# Patient Record
Sex: FEMALE | Race: WHITE | NOT HISPANIC OR LATINO
[De-identification: names, ages, dates, MRNs, and addresses within clinical notes are randomized per-mention and may not be internally consistent; named-entity substitution may affect disease eponyms.]

---

## 2018-06-11 PROBLEM — Z00.00 ENCOUNTER FOR PREVENTIVE HEALTH EXAMINATION: Status: ACTIVE | Noted: 2018-06-11

## 2018-06-19 ENCOUNTER — APPOINTMENT (OUTPATIENT)
Dept: PLASTIC SURGERY | Facility: CLINIC | Age: 43
End: 2018-06-19
Payer: COMMERCIAL

## 2018-06-19 VITALS
HEIGHT: 65 IN | TEMPERATURE: 97.6 F | BODY MASS INDEX: 34.16 KG/M2 | HEART RATE: 62 BPM | SYSTOLIC BLOOD PRESSURE: 118 MMHG | WEIGHT: 205 LBS | DIASTOLIC BLOOD PRESSURE: 81 MMHG

## 2018-06-19 DIAGNOSIS — Z80.8 FAMILY HISTORY OF MALIGNANT NEOPLASM OF OTHER ORGANS OR SYSTEMS: ICD-10-CM

## 2018-06-19 DIAGNOSIS — Z83.42 FAMILY HISTORY OF FAMILIAL HYPERCHOLESTEROLEMIA: ICD-10-CM

## 2018-06-19 DIAGNOSIS — Z80.0 FAMILY HISTORY OF MALIGNANT NEOPLASM OF DIGESTIVE ORGANS: ICD-10-CM

## 2018-06-19 DIAGNOSIS — Z87.891 PERSONAL HISTORY OF NICOTINE DEPENDENCE: ICD-10-CM

## 2018-06-19 DIAGNOSIS — Z78.9 OTHER SPECIFIED HEALTH STATUS: ICD-10-CM

## 2018-06-19 DIAGNOSIS — Z82.49 FAMILY HISTORY OF ISCHEMIC HEART DISEASE AND OTHER DISEASES OF THE CIRCULATORY SYSTEM: ICD-10-CM

## 2018-06-19 DIAGNOSIS — N83.8 OTHER NONINFLAMMATORY DISORDERS OF OVARY, FALLOPIAN TUBE AND BROAD LIGAMENT: ICD-10-CM

## 2018-06-19 DIAGNOSIS — Z87.09 PERSONAL HISTORY OF OTHER DISEASES OF THE RESPIRATORY SYSTEM: ICD-10-CM

## 2018-06-19 PROCEDURE — 99203 OFFICE O/P NEW LOW 30 MIN: CPT

## 2018-06-19 RX ORDER — MV-MIN/FOLIC/VIT K/LYCOP/COQ10 200-100MCG
CAPSULE ORAL
Refills: 0 | Status: ACTIVE | COMMUNITY

## 2018-06-19 RX ORDER — GLUC/MSM/COLGN2/HYAL/ANTIARTH3 375-375-20
TABLET ORAL
Refills: 0 | Status: ACTIVE | COMMUNITY

## 2018-06-19 RX ORDER — PNV NO.95/FERROUS FUM/FOLIC AC 28MG-0.8MG
TABLET ORAL
Refills: 0 | Status: ACTIVE | COMMUNITY

## 2018-06-19 RX ORDER — CHROMIUM 200 MCG
TABLET ORAL
Refills: 0 | Status: ACTIVE | COMMUNITY

## 2018-06-29 RX ORDER — LIDOCAINE 40 MG/G
4 CREAM TOPICAL
Qty: 15 | Refills: 0 | Status: ACTIVE | COMMUNITY
Start: 2018-06-29 | End: 1900-01-01

## 2018-07-03 ENCOUNTER — APPOINTMENT (OUTPATIENT)
Dept: PLASTIC SURGERY | Facility: CLINIC | Age: 43
End: 2018-07-03
Payer: COMMERCIAL

## 2018-07-03 ENCOUNTER — LABORATORY RESULT (OUTPATIENT)
Age: 43
End: 2018-07-03

## 2018-07-03 PROCEDURE — 13151 CMPLX RPR E/N/E/L 1.1-2.5 CM: CPT

## 2018-07-03 PROCEDURE — 11442 EXC FACE-MM B9+MARG 1.1-2 CM: CPT

## 2018-07-10 ENCOUNTER — APPOINTMENT (OUTPATIENT)
Dept: PLASTIC SURGERY | Facility: CLINIC | Age: 43
End: 2018-07-10
Payer: COMMERCIAL

## 2018-07-10 PROCEDURE — 99024 POSTOP FOLLOW-UP VISIT: CPT

## 2018-07-16 ENCOUNTER — APPOINTMENT (OUTPATIENT)
Dept: PLASTIC SURGERY | Facility: CLINIC | Age: 43
End: 2018-07-16
Payer: COMMERCIAL

## 2018-07-16 PROCEDURE — 99024 POSTOP FOLLOW-UP VISIT: CPT

## 2018-07-31 ENCOUNTER — APPOINTMENT (OUTPATIENT)
Dept: PLASTIC SURGERY | Facility: CLINIC | Age: 43
End: 2018-07-31
Payer: COMMERCIAL

## 2018-07-31 PROCEDURE — 99024 POSTOP FOLLOW-UP VISIT: CPT

## 2019-02-18 ENCOUNTER — NON-APPOINTMENT (OUTPATIENT)
Age: 44
End: 2019-02-18

## 2019-02-18 ENCOUNTER — APPOINTMENT (OUTPATIENT)
Dept: INTERNAL MEDICINE | Facility: CLINIC | Age: 44
End: 2019-02-18
Payer: COMMERCIAL

## 2019-02-18 ENCOUNTER — LABORATORY RESULT (OUTPATIENT)
Age: 44
End: 2019-02-18

## 2019-02-18 VITALS
SYSTOLIC BLOOD PRESSURE: 126 MMHG | HEIGHT: 64.17 IN | OXYGEN SATURATION: 98 % | HEART RATE: 71 BPM | DIASTOLIC BLOOD PRESSURE: 80 MMHG | WEIGHT: 219.47 LBS | BODY MASS INDEX: 37.47 KG/M2 | TEMPERATURE: 97.5 F

## 2019-02-18 DIAGNOSIS — R59.9 ENLARGED LYMPH NODES, UNSPECIFIED: ICD-10-CM

## 2019-02-18 DIAGNOSIS — D56.3 THALASSEMIA MINOR: ICD-10-CM

## 2019-02-18 DIAGNOSIS — H52.209 UNSPECIFIED ASTIGMATISM, UNSPECIFIED EYE: ICD-10-CM

## 2019-02-18 LAB
APPEARANCE: CLEAR
APPEARANCE: CLEAR
BACTERIA: ABNORMAL
BILIRUBIN URINE: NEGATIVE
BILIRUBIN URINE: NEGATIVE
BLOOD URINE: NEGATIVE
BLOOD URINE: NEGATIVE
COLOR: YELLOW
COLOR: YELLOW
GLUCOSE QUALITATIVE U: NEGATIVE
GLUCOSE QUALITATIVE U: NEGATIVE
HYALINE CASTS: 1 /LPF
KETONES URINE: NEGATIVE
KETONES URINE: NEGATIVE
LEUKOCYTE ESTERASE URINE: NEGATIVE
LEUKOCYTE ESTERASE URINE: NEGATIVE
MICROSCOPIC-UA: NORMAL
NITRITE URINE: NEGATIVE
NITRITE URINE: NEGATIVE
PH URINE: 7
PH URINE: 7
PROTEIN URINE: NEGATIVE
PROTEIN URINE: NEGATIVE
RED BLOOD CELLS URINE: 4 /HPF
SPECIFIC GRAVITY URINE: 1.02
SPECIFIC GRAVITY URINE: 1.02
SQUAMOUS EPITHELIAL CELLS: 8 /HPF
UROBILINOGEN URINE: NORMAL
UROBILINOGEN URINE: NORMAL
WHITE BLOOD CELLS URINE: 2 /HPF

## 2019-02-18 PROCEDURE — G0442 ANNUAL ALCOHOL SCREEN 15 MIN: CPT

## 2019-02-18 PROCEDURE — 99386 PREV VISIT NEW AGE 40-64: CPT | Mod: 25

## 2019-02-18 PROCEDURE — G0444 DEPRESSION SCREEN ANNUAL: CPT

## 2019-02-18 PROCEDURE — 93000 ELECTROCARDIOGRAM COMPLETE: CPT

## 2019-02-18 PROCEDURE — 81003 URINALYSIS AUTO W/O SCOPE: CPT | Mod: QW

## 2019-02-18 PROCEDURE — 99203 OFFICE O/P NEW LOW 30 MIN: CPT | Mod: 25

## 2019-02-18 PROCEDURE — 36415 COLL VENOUS BLD VENIPUNCTURE: CPT

## 2019-02-18 NOTE — PHYSICAL EXAM
[No Acute Distress] : no acute distress [Well Nourished] : well nourished [Well Developed] : well developed [Well-Appearing] : well-appearing [Normal Sclera/Conjunctiva] : normal sclera/conjunctiva [PERRL] : pupils equal round and reactive to light [EOMI] : extraocular movements intact [Normal Outer Ear/Nose] : the outer ears and nose were normal in appearance [Normal Oropharynx] : the oropharynx was normal [Normal TMs] : both tympanic membranes were normal [Normal Nasal Mucosa] : the nasal mucosa was normal [No JVD] : no jugular venous distention [Supple] : supple [No Lymphadenopathy] : no lymphadenopathy [No Respiratory Distress] : no respiratory distress  [Clear to Auscultation] : lungs were clear to auscultation bilaterally [No Accessory Muscle Use] : no accessory muscle use [Normal Rate] : normal rate  [Regular Rhythm] : with a regular rhythm [Normal S1, S2] : normal S1 and S2 [No Murmur] : no murmur heard [No Carotid Bruits] : no carotid bruits [No Abdominal Bruit] : a ~M bruit was not heard ~T in the abdomen [No Varicosities] : no varicosities [Pedal Pulses Present] : the pedal pulses are present [No Edema] : there was no peripheral edema [No Extremity Clubbing/Cyanosis] : no extremity clubbing/cyanosis [No Palpable Aorta] : no palpable aorta [Normal Appearance] : normal in appearance [No Nipple Discharge] : no nipple discharge [No Axillary Lymphadenopathy] : no axillary lymphadenopathy [Soft] : abdomen soft [Non Tender] : non-tender [Non-distended] : non-distended [No Masses] : no abdominal mass palpated [No HSM] : no HSM [Normal Bowel Sounds] : normal bowel sounds [Normal Supraclavicular Nodes] : no supraclavicular lymphadenopathy [Normal Axillary Nodes] : no axillary lymphadenopathy [Normal Posterior Cervical Nodes] : no posterior cervical lymphadenopathy [Normal Anterior Cervical Nodes] : no anterior cervical lymphadenopathy [Normal Inguinal Nodes] : no inguinal lymphadenopathy [No CVA Tenderness] : no CVA  tenderness [No Spinal Tenderness] : no spinal tenderness [No Joint Swelling] : no joint swelling [Grossly Normal Strength/Tone] : grossly normal strength/tone [No Rash] : no rash [No Skin Lesions] : no skin lesions [Normal Gait] : normal gait [Coordination Grossly Intact] : coordination grossly intact [No Focal Deficits] : no focal deficits [Cranial Nerves Oculomotor (III)] : the extraocular motions were intact [Cranial Nerves Trigeminal (V)] : sensation to the face and masseter strength were intact [Cranial Nerves Facial (VII)] : facial strength was intact bilaterally [Cranial Nerves Vestibulocochlear (VIII)] : hearing was intact [Cranial Nerves Glossopharyngeal (IX)] : there was normal movement of the soft palate and normal gag [Cranial Nerves Accessory (XI - Cranial And Spinal)] : shoulder shrug was intact bilaterally [Cranial Nerves Hypoglossal (XII)] : there was no tongue deviation with protrusion [Sensation Tactile Decrease] : light touch was intact [Speech Grossly Normal] : speech grossly normal [Normal Affect] : the affect was normal [Normal Insight/Judgement] : insight and judgment were intact

## 2019-02-18 NOTE — HISTORY OF PRESENT ILLNESS
[FreeTextEntry1] : Patient presents for annual physical. [de-identified] : Patient presents for annual physical, patient has no acute concerns patient has a history of migraines gets about 5-6 episodes a month. headache is bitemporal associated with nausea and photophobia makes activities of daily living such as walking up the stairs difficult. patient has tried tripped hands with no success. Patient also has a sister who has a history of thyroid cancer patient does have a thyroid nodule for which she has had biopsy in the past will be following up with endocrinologist in April. Last mammogram was 3 months ago, patient had colonoscopy in 2000 he has she had some rectal bleeding. Colonoscopy K. back normal.

## 2019-02-18 NOTE — ASSESSMENT
[FreeTextEntry1] : 1) Annual physical: Patient declined influenza, and a follow up Pap smear with gynecologist in 2 months. Patient had normal colonoscopy to rectal bleeding in 2008 EKG showed T-wave inversion that is present in the past. Patient denies any symptoms of angina.\par 2) migraine: Patient has tried contents with no alleviation of symptoms advised to take coenzyme Q10 and vitamin B 2 daily.\par 3) Thyroid Nodule: Patient will follow up with endocrinologist patient has had fine needle aspiration in the past. Patient denies any change in the size of the thyroid any hoarseness of voice any difficulty swallowing. Check TSH free T3 and free T4\par 4) Anemia: Patient has thalassemia trait. will repeat CBC today today.\par 5) Enlarged lymph node: Near the right breast patient had a mammogram 2 months ago will have a followup mammogram and ultrasound in 3 months. Patient denies any breast changes any tenderness on palpation of the breasts or any nipple abnormalities.\par 6) Astigmatism:  referral placed for ophthalmologist.

## 2019-02-18 NOTE — HEALTH RISK ASSESSMENT
[Very Good] : ~his/her~  mood as very good [] : No [0] : 2) Feeling down, depressed, or hopeless: Not at all (0) [de-identified] : None [de-identified] : Endocrinologist Dr. Mcgarry [Patient reported mammogram was normal] : Patient reported mammogram was normal [Change in mental status noted] : No change in mental status noted [Language] : denies difficulty with language [Behavior] : denies difficulty with behavior [Learning/Retaining New Information] : denies difficulty learning/retaining new information [Handling Complex Tasks] : denies difficulty handling complex tasks [Reasoning] : denies difficulty with reasoning [Employed] : employed [Feels Safe at Home] : Feels safe at home [Reports changes in hearing] : Reports no changes in hearing [Reports changes in vision] : Reports no changes in vision [Reports changes in dental health] : Reports no changes in dental health [Smoke Detector] : smoke detector [Carbon Monoxide Detector] : carbon monoxide detector [Guns at Home] : no guns at home [Safety elements used in home] : safety elements used in home [Seat Belt] :  uses seat belt [Sunscreen] : uses sunscreen [Travel to Developing Areas] : does not  travel to developing areas [TB Exposure] : is not being exposed to tuberculosis [Caregiver Concerns] : does not have caregiver concerns [MammogramDate] : 11/2018 [PapSmearComments] : Gynecologist appointment 4/2019 [ColonoscopyDate] : 2008 [ColonoscopyComments] : Normal [HIVComments] : Previously tested [HepatitisCComments] : Previosuly tested [FreeTextEntry2] :

## 2019-02-20 ENCOUNTER — MESSAGE (OUTPATIENT)
Age: 44
End: 2019-02-20

## 2019-02-20 LAB
25(OH)D3 SERPL-MCNC: 23.3 NG/ML
ALBUMIN SERPL ELPH-MCNC: 4.5 G/DL
ALP BLD-CCNC: 75 U/L
ALT SERPL-CCNC: 37 U/L
ANION GAP SERPL CALC-SCNC: 12 MMOL/L
AST SERPL-CCNC: 26 U/L
BASOPHILS # BLD AUTO: 0.06 K/UL
BASOPHILS NFR BLD AUTO: 1 %
BILIRUB SERPL-MCNC: 0.2 MG/DL
BUN SERPL-MCNC: 15 MG/DL
CALCIUM SERPL-MCNC: 9.6 MG/DL
CHLORIDE SERPL-SCNC: 103 MMOL/L
CHOLEST SERPL-MCNC: 223 MG/DL
CHOLEST/HDLC SERPL: 2.9 RATIO
CO2 SERPL-SCNC: 24 MMOL/L
CREAT SERPL-MCNC: 0.78 MG/DL
EOSINOPHIL # BLD AUTO: 0.33 K/UL
EOSINOPHIL NFR BLD AUTO: 5.3 %
GLUCOSE SERPL-MCNC: 93 MG/DL
HBA1C MFR BLD HPLC: 5.4 %
HCT VFR BLD CALC: 39.6 %
HDLC SERPL-MCNC: 77 MG/DL
HGB BLD-MCNC: 11.8 G/DL
IMM GRANULOCYTES NFR BLD AUTO: 0.2 %
LDLC SERPL CALC-MCNC: 124 MG/DL
LYMPHOCYTES # BLD AUTO: 1.37 K/UL
LYMPHOCYTES NFR BLD AUTO: 21.8 %
MAN DIFF?: NORMAL
MCHC RBC-ENTMCNC: 20.8 PG
MCHC RBC-ENTMCNC: 29.8 GM/DL
MCV RBC AUTO: 69.8 FL
MONOCYTES # BLD AUTO: 0.61 K/UL
MONOCYTES NFR BLD AUTO: 9.7 %
NEUTROPHILS # BLD AUTO: 3.9 K/UL
NEUTROPHILS NFR BLD AUTO: 62 %
PLATELET # BLD AUTO: 357 K/UL
POTASSIUM SERPL-SCNC: 4.3 MMOL/L
PROT SERPL-MCNC: 6.9 G/DL
RBC # BLD: 5.67 M/UL
RBC # FLD: 15.7 %
SODIUM SERPL-SCNC: 139 MMOL/L
T3 SERPL-MCNC: 116 NG/DL
TRIGL SERPL-MCNC: 112 MG/DL
TSH SERPL-ACNC: 2.23 UIU/ML
VIT B12 SERPL-MCNC: 479 PG/ML
WBC # FLD AUTO: 6.28 K/UL

## 2019-04-30 ENCOUNTER — LABORATORY RESULT (OUTPATIENT)
Age: 44
End: 2019-04-30

## 2019-04-30 ENCOUNTER — APPOINTMENT (OUTPATIENT)
Dept: PLASTIC SURGERY | Facility: CLINIC | Age: 44
End: 2019-04-30
Payer: MEDICAID

## 2019-04-30 PROCEDURE — 27327 EXC THIGH/KNEE LES SC < 3 CM: CPT

## 2019-04-30 PROCEDURE — 13120 CMPLX RPR S/A/L 1.1-2.5 CM: CPT | Mod: 59

## 2019-04-30 NOTE — PROCEDURE
[FreeTextEntry6] : Preop dx: lipoma, right thigh\par Postopdx: same\par Procedure: excision 2.1cm lipoma right thigh and complex closure of thigh, 2.1cm\par Anesthesia: local 1% w/ epi\par Specimens: to path\par Procedure:\par 	IC obtained. Lesion demarcated.  Lido 1% w/ epi injected.  15 blade used to incise skin.  Lesion encountered in the subcutaneous plane and dissected circumferentially. Removed in its entirety, 2.1cm.  Skin edges widely undermined and closed in layers with monocryl for a 2.1cm complex closure. Mastisol and steristrips placed.  No complications.  Specimen sent to path\par \par

## 2019-05-06 ENCOUNTER — APPOINTMENT (OUTPATIENT)
Dept: PLASTIC SURGERY | Facility: CLINIC | Age: 44
End: 2019-05-06
Payer: SELF-PAY

## 2019-05-06 DIAGNOSIS — D17.23 BENIGN LIPOMATOUS NEOPLASM OF SKIN AND SUBCUTANEOUS TISSUE OF RIGHT LEG: ICD-10-CM

## 2019-05-06 PROCEDURE — 99024 POSTOP FOLLOW-UP VISIT: CPT

## 2019-05-06 NOTE — HISTORY OF PRESENT ILLNESS
[FreeTextEntry1] : s/p excisional biopsy and closure of right thigh mass ( result:lipoma) DOP 04/30/19.\par Pt is doing better w/time, denies any pain or discomfort, Steri-Strips remain intact.\par Here for follow up.

## 2019-09-24 ENCOUNTER — APPOINTMENT (OUTPATIENT)
Dept: PLASTIC SURGERY | Facility: CLINIC | Age: 44
End: 2019-09-24
Payer: COMMERCIAL

## 2019-09-24 ENCOUNTER — LABORATORY RESULT (OUTPATIENT)
Age: 44
End: 2019-09-24

## 2019-09-24 DIAGNOSIS — D22.9 MELANOCYTIC NEVI, UNSPECIFIED: ICD-10-CM

## 2019-09-24 PROCEDURE — 11442 EXC FACE-MM B9+MARG 1.1-2 CM: CPT

## 2019-09-24 PROCEDURE — 13151 CMPLX RPR E/N/E/L 1.1-2.5 CM: CPT

## 2019-09-27 PROBLEM — D22.9 INTRADERMAL MELANOCYTIC NEVUS: Status: ACTIVE | Noted: 2018-06-19

## 2019-09-27 NOTE — PROCEDURE
[FreeTextEntry6] : Preopdx:nasal  nevus\par Procedure: excisional biopsy   1.1cm nevus of nose and complex closure 1.1cm\par Anesthesia: local 1% w/epi\par Specimens: to path on formalin\par No complications\par \par Summary:\par IC obtained.  Lesion demarcated with marking pen.  1%lido with epinephrine injected.  15 blade used to incise full thickness.    1.1Cm  lesion excised as an ellipse full thickness in subcutaneous plane.   Hemostasis obtained with cautery.  Skin edges widely undermined and closed for a complex closure of  1.1cm.  bacitracin and steristrips placed.\par

## 2020-05-13 ENCOUNTER — APPOINTMENT (OUTPATIENT)
Dept: INTERNAL MEDICINE | Facility: CLINIC | Age: 45
End: 2020-05-13
Payer: COMMERCIAL

## 2020-05-13 ENCOUNTER — NON-APPOINTMENT (OUTPATIENT)
Age: 45
End: 2020-05-13

## 2020-05-13 VITALS
WEIGHT: 250.32 LBS | SYSTOLIC BLOOD PRESSURE: 103 MMHG | HEART RATE: 75 BPM | OXYGEN SATURATION: 100 % | BODY MASS INDEX: 41.7 KG/M2 | DIASTOLIC BLOOD PRESSURE: 69 MMHG | TEMPERATURE: 98.8 F | HEIGHT: 64.96 IN

## 2020-05-13 DIAGNOSIS — Z01.818 ENCOUNTER FOR OTHER PREPROCEDURAL EXAMINATION: ICD-10-CM

## 2020-05-13 DIAGNOSIS — E04.1 NONTOXIC SINGLE THYROID NODULE: ICD-10-CM

## 2020-05-13 PROCEDURE — 99214 OFFICE O/P EST MOD 30 MIN: CPT | Mod: 25

## 2020-05-13 PROCEDURE — 36415 COLL VENOUS BLD VENIPUNCTURE: CPT

## 2020-05-15 DIAGNOSIS — R31.29 OTHER MICROSCOPIC HEMATURIA: ICD-10-CM

## 2020-05-15 LAB
25(OH)D3 SERPL-MCNC: 28.8 NG/ML
ALBUMIN SERPL ELPH-MCNC: 4.5 G/DL
ALP BLD-CCNC: 83 U/L
ALT SERPL-CCNC: 16 U/L
ANION GAP SERPL CALC-SCNC: 13 MMOL/L
APPEARANCE: CLEAR
APPEARANCE: CLEAR
APTT BLD: 33.4 SEC
AST SERPL-CCNC: 19 U/L
BACTERIA: NEGATIVE
BASOPHILS # BLD AUTO: 0.06 K/UL
BASOPHILS NFR BLD AUTO: 1 %
BILIRUB SERPL-MCNC: 0.4 MG/DL
BILIRUBIN URINE: NEGATIVE
BILIRUBIN URINE: NEGATIVE
BLOOD URINE: NEGATIVE
BLOOD URINE: NEGATIVE
BUN SERPL-MCNC: 13 MG/DL
CALCIUM SERPL-MCNC: 9.4 MG/DL
CHLORIDE SERPL-SCNC: 102 MMOL/L
CHOLEST SERPL-MCNC: 222 MG/DL
CHOLEST/HDLC SERPL: 3.5 RATIO
CO2 SERPL-SCNC: 23 MMOL/L
COLOR: YELLOW
COLOR: YELLOW
CREAT SERPL-MCNC: 0.73 MG/DL
EOSINOPHIL # BLD AUTO: 0.08 K/UL
EOSINOPHIL NFR BLD AUTO: 1.3 %
ESTIMATED AVERAGE GLUCOSE: 111 MG/DL
GLUCOSE QUALITATIVE U: NEGATIVE
GLUCOSE QUALITATIVE U: NEGATIVE
GLUCOSE SERPL-MCNC: 87 MG/DL
HBA1C MFR BLD HPLC: 5.5 %
HCT VFR BLD CALC: 41.1 %
HDLC SERPL-MCNC: 63 MG/DL
HGB BLD-MCNC: 11.9 G/DL
HYALINE CASTS: 2 /LPF
IMM GRANULOCYTES NFR BLD AUTO: 0.2 %
INR PPP: 0.97 RATIO
KETONES URINE: NEGATIVE
KETONES URINE: NEGATIVE
LDLC SERPL CALC-MCNC: 132 MG/DL
LEUKOCYTE ESTERASE URINE: NEGATIVE
LEUKOCYTE ESTERASE URINE: NEGATIVE
LYMPHOCYTES # BLD AUTO: 1.32 K/UL
LYMPHOCYTES NFR BLD AUTO: 22 %
MAN DIFF?: NORMAL
MCHC RBC-ENTMCNC: 20.4 PG
MCHC RBC-ENTMCNC: 29 GM/DL
MCV RBC AUTO: 70.6 FL
MICROSCOPIC-UA: NORMAL
MONOCYTES # BLD AUTO: 0.67 K/UL
MONOCYTES NFR BLD AUTO: 11.1 %
NEUTROPHILS # BLD AUTO: 3.87 K/UL
NEUTROPHILS NFR BLD AUTO: 64.4 %
NITRITE URINE: NEGATIVE
NITRITE URINE: NEGATIVE
PH URINE: 6.5
PH URINE: 6.5
PLATELET # BLD AUTO: 430 K/UL
POTASSIUM SERPL-SCNC: 4.6 MMOL/L
PROT SERPL-MCNC: 7.1 G/DL
PROTEIN URINE: NORMAL
PROTEIN URINE: NORMAL
PT BLD: 11 SEC
RBC # BLD: 5.82 M/UL
RBC # FLD: 16 %
RED BLOOD CELLS URINE: 5 /HPF
SODIUM SERPL-SCNC: 138 MMOL/L
SPECIFIC GRAVITY URINE: 1.02
SPECIFIC GRAVITY URINE: 1.02
SQUAMOUS EPITHELIAL CELLS: 5 /HPF
TRIGL SERPL-MCNC: 134 MG/DL
TSH SERPL-ACNC: 1.34 UIU/ML
UROBILINOGEN URINE: NORMAL
UROBILINOGEN URINE: NORMAL
WBC # FLD AUTO: 6.01 K/UL
WHITE BLOOD CELLS URINE: 1 /HPF

## 2020-05-15 NOTE — HISTORY OF PRESENT ILLNESS
[No Pertinent Cardiac History] : no history of aortic stenosis, atrial fibrillation, coronary artery disease, recent myocardial infarction, or implantable device/pacemaker [No Pertinent Pulmonary History] : no history of asthma, COPD, sleep apnea, or smoking [No Adverse Anesthesia Reaction] : no adverse anesthesia reaction in self or family member [(Patient denies any chest pain, claudication, dyspnea on exertion, orthopnea, palpitations or syncope)] : Patient denies any chest pain, claudication, dyspnea on exertion, orthopnea, palpitations or syncope [Good (7-10 METs)] : Good (7-10 METs) [Chronic Anticoagulation] : no chronic anticoagulation [Chronic Kidney Disease] : no chronic kidney disease [Diabetes] : no diabetes [FreeTextEntry1] : D and C hysteroscopy, and polypectomy [FreeTextEntry4] : atient also has chronic migraine subsided associated with nausea triggered by change in weather. Also associated with photophobia. Denies any dizziness lightheadedness numbness. Denies any weakness or paresthesias.Denies any presyncope. Headache has not changed in characteror

## 2020-05-15 NOTE — ASSESSMENT
[High Risk Surgery - Intraperitoneal, Intrathoracic or Supringuinal Vascular Procedures] : High Risk Surgery - Intraperitoneal, Intrathoracic or Supringuinal Vascular Procedures - No (0) [Ischemic Heart Disease] : Ischemic Heart Disease - No (0) [Congestive Heart Failure] : Congestive Heart Failure - No (0) [Prior Cerebrovascular Accident or TIA] : Prior Cerebrovascular Accident or TIA - No (0) [Creatinine >= 2mg/dL (1 Point)] : Creatinine >= 2mg/dL - No (0) [Insulin-dependent Diabetic (1 Point)] : Insulin-dependent Diabetic - No (0) [Patient Optimized for Surgery] : Patient optimized for surgery [Modify medications prior to procedure] : Modify medications prior to procedure [FreeTextEntry7] : Discontinue NSAID's one week prior to surgery.  [FreeTextEntry4] : Patient's migraines have not changed in character, neurologic exam within normal limits. Discussed different options with patient, the patient to see a neurologist in the future. call office if any worsening symptoms. To f/u with endocrinologist for thyroid nodule.

## 2020-05-15 NOTE — PHYSICAL EXAM
[Normal Appearance] : normal in appearance [No Masses] : no palpable masses [No Nipple Discharge] : no nipple discharge [No Axillary Lymphadenopathy] : no axillary lymphadenopathy [Normal] : affect was normal and insight and judgment were intact [de-identified] : right sided nodule, non tender

## 2020-05-19 ENCOUNTER — RESULT REVIEW (OUTPATIENT)
Age: 45
End: 2020-05-19

## 2020-10-05 ENCOUNTER — APPOINTMENT (OUTPATIENT)
Dept: INTERNAL MEDICINE | Facility: CLINIC | Age: 45
End: 2020-10-05
Payer: COMMERCIAL

## 2020-10-05 ENCOUNTER — NON-APPOINTMENT (OUTPATIENT)
Age: 45
End: 2020-10-05

## 2020-10-05 VITALS
DIASTOLIC BLOOD PRESSURE: 82 MMHG | TEMPERATURE: 98.2 F | OXYGEN SATURATION: 99 % | BODY MASS INDEX: 41.81 KG/M2 | WEIGHT: 244.91 LBS | HEART RATE: 89 BPM | HEIGHT: 64.17 IN | SYSTOLIC BLOOD PRESSURE: 117 MMHG

## 2020-10-05 DIAGNOSIS — M25.562 PAIN IN LEFT KNEE: ICD-10-CM

## 2020-10-05 PROCEDURE — 99213 OFFICE O/P EST LOW 20 MIN: CPT

## 2020-10-05 NOTE — ASSESSMENT
[FreeTextEntry1] : No signs of swelling on exam. Possibly tendonitis or meniscal pathology? To see orthopedic tomorrow, no signs of inflammation

## 2020-10-05 NOTE — HISTORY OF PRESENT ILLNESS
[FreeTextEntry8] : Patient presents to the office has left knee pain, located behind the knee and sometimes goes around to the front. Denies any gait instability denies any trauma swelling.Denies any clicking, catching or back pain.

## 2020-10-05 NOTE — PHYSICAL EXAM
[Normal] : normal gait, coordination grossly intact, no focal deficits and deep tendon reflexes were 2+ and symmetric [de-identified] : tenderness in the posterior knee, mcurray test positive

## 2021-01-05 ENCOUNTER — LABORATORY RESULT (OUTPATIENT)
Age: 46
End: 2021-01-05

## 2021-01-05 ENCOUNTER — APPOINTMENT (OUTPATIENT)
Dept: INTERNAL MEDICINE | Facility: CLINIC | Age: 46
End: 2021-01-05
Payer: SELF-PAY

## 2021-01-05 VITALS
DIASTOLIC BLOOD PRESSURE: 85 MMHG | HEIGHT: 64.17 IN | TEMPERATURE: 97.8 F | WEIGHT: 248.98 LBS | OXYGEN SATURATION: 100 % | BODY MASS INDEX: 42.51 KG/M2 | HEART RATE: 76 BPM | SYSTOLIC BLOOD PRESSURE: 123 MMHG

## 2021-01-05 DIAGNOSIS — Z00.01 ENCOUNTER FOR GENERAL ADULT MEDICAL EXAMINATION WITH ABNORMAL FINDINGS: ICD-10-CM

## 2021-01-05 DIAGNOSIS — L65.9 NONSCARRING HAIR LOSS, UNSPECIFIED: ICD-10-CM

## 2021-01-05 DIAGNOSIS — Z11.59 ENCOUNTER FOR SCREENING FOR OTHER VIRAL DISEASES: ICD-10-CM

## 2021-01-05 DIAGNOSIS — D64.9 ANEMIA, UNSPECIFIED: ICD-10-CM

## 2021-01-05 DIAGNOSIS — R06.83 SNORING: ICD-10-CM

## 2021-01-05 PROCEDURE — 36415 COLL VENOUS BLD VENIPUNCTURE: CPT

## 2021-01-05 PROCEDURE — 99072 ADDL SUPL MATRL&STAF TM PHE: CPT

## 2021-01-05 PROCEDURE — 99214 OFFICE O/P EST MOD 30 MIN: CPT | Mod: 25

## 2021-01-05 PROCEDURE — 99396 PREV VISIT EST AGE 40-64: CPT | Mod: 25

## 2021-01-09 NOTE — ASSESSMENT
[FreeTextEntry1] : Blood work done today, for joint pains and fatigue and elevated platelets advised to see rheumatology consider autoimmune work-up check for rheumatoid arthritis today, no signs of any synovial thickening on exam.  Patient also to have sleep study done given increased fatigue.  Emphasized the importance of weight loss and to consider seeing nutritionist.  Thyroid function tests were normal, consider the possibility of neurology referral and to start beta-blocker therapy for preventative given chronic headaches.  Patient currently deferred treatment advised lifestyle modification advised, appropriate sleep hygiene exercise avoidance of any triggers.

## 2021-01-09 NOTE — HISTORY OF PRESENT ILLNESS
[FreeTextEntry1] : Patient presents for annual physical and medical concerns [de-identified] : Patient states that she has been feeling more fatigue and has some joint stiffness.  Denies any fevers rashes muscle aches.  Does feel sleepy during the day does snore at night.  Denies any unintentional weight loss went to the endocrinologist and was found to have elevated platelets.  Denies any family history of autoimmune disease mother will be seeing rheumatologist for positive rheumatoid factor.  Continues to have migraines occurs about 3 times a week, has tried over-the-counter medications with no alleviation.  Has not been to neurologist.  Denies any change in the character or frequency of headaches denies any headaches awakening from sleep or worsening with exertion.  Denies any weakness numbness gait instability.  Has follow-up appointment for mammogram and ultrasound next week.

## 2021-01-11 LAB
25(OH)D3 SERPL-MCNC: 28.7 NG/ML
ALBUMIN SERPL ELPH-MCNC: 4.6 G/DL
ALP BLD-CCNC: 94 U/L
ALT SERPL-CCNC: 41 U/L
ANION GAP SERPL CALC-SCNC: 13 MMOL/L
APPEARANCE: CLEAR
APPEARANCE: CLEAR
AST SERPL-CCNC: 29 U/L
BACTERIA: NEGATIVE
BASOPHILS # BLD AUTO: 0.05 K/UL
BASOPHILS NFR BLD AUTO: 0.8 %
BILIRUB SERPL-MCNC: 0.4 MG/DL
BILIRUBIN URINE: NEGATIVE
BILIRUBIN URINE: NEGATIVE
BLOOD URINE: NEGATIVE
BLOOD URINE: NEGATIVE
BUN SERPL-MCNC: 19 MG/DL
CALCIUM SERPL-MCNC: 9.7 MG/DL
CCP AB SER IA-ACNC: <8 UNITS
CHLORIDE SERPL-SCNC: 102 MMOL/L
CHOLEST SERPL-MCNC: 242 MG/DL
CO2 SERPL-SCNC: 22 MMOL/L
COLOR: NORMAL
COLOR: NORMAL
CREAT SERPL-MCNC: 0.79 MG/DL
EOSINOPHIL # BLD AUTO: 0.19 K/UL
EOSINOPHIL NFR BLD AUTO: 3.1 %
ERYTHROCYTE [SEDIMENTATION RATE] IN BLOOD BY WESTERGREN METHOD: 54 MM/HR
ESTIMATED AVERAGE GLUCOSE: 114 MG/DL
FERRITIN SERPL-MCNC: 48 NG/ML
GLUCOSE QUALITATIVE U: NEGATIVE
GLUCOSE QUALITATIVE U: NEGATIVE
GLUCOSE SERPL-MCNC: 93 MG/DL
HBA1C MFR BLD HPLC: 5.6 %
HCT VFR BLD CALC: 41.3 %
HDLC SERPL-MCNC: 66 MG/DL
HGB BLD-MCNC: 12.5 G/DL
HYALINE CASTS: 0 /LPF
IMM GRANULOCYTES NFR BLD AUTO: 0.2 %
IRON SATN MFR SERPL: 26 %
IRON SERPL-MCNC: 89 UG/DL
KETONES URINE: NEGATIVE
KETONES URINE: NEGATIVE
LDLC SERPL CALC-MCNC: 147 MG/DL
LEUKOCYTE ESTERASE URINE: NEGATIVE
LEUKOCYTE ESTERASE URINE: NEGATIVE
LYMPHOCYTES # BLD AUTO: 1.07 K/UL
LYMPHOCYTES NFR BLD AUTO: 17.5 %
MAN DIFF?: NORMAL
MCHC RBC-ENTMCNC: 21.1 PG
MCHC RBC-ENTMCNC: 30.3 GM/DL
MCV RBC AUTO: 69.6 FL
MICROSCOPIC-UA: NORMAL
MONOCYTES # BLD AUTO: 0.58 K/UL
MONOCYTES NFR BLD AUTO: 9.5 %
NEUTROPHILS # BLD AUTO: 4.21 K/UL
NEUTROPHILS NFR BLD AUTO: 68.9 %
NITRITE URINE: NEGATIVE
NITRITE URINE: NEGATIVE
NONHDLC SERPL-MCNC: 176 MG/DL
PH URINE: 6.5
PH URINE: 6.5
PLATELET # BLD AUTO: 403 K/UL
POTASSIUM SERPL-SCNC: 4.5 MMOL/L
PROT SERPL-MCNC: 7.2 G/DL
PROTEIN URINE: NEGATIVE
PROTEIN URINE: NEGATIVE
RBC # BLD: 5.93 M/UL
RBC # FLD: 16.1 %
RED BLOOD CELLS URINE: 2 /HPF
RF+CCP IGG SER-IMP: NEGATIVE
RHEUMATOID FACT SER QL: <10 IU/ML
SARS-COV-2 IGG SERPL IA-ACNC: 46.2 AU/ML
SARS-COV-2 IGG SERPL QL IA: POSITIVE
SODIUM SERPL-SCNC: 137 MMOL/L
SPECIFIC GRAVITY URINE: 1.02
SPECIFIC GRAVITY URINE: 1.02
SQUAMOUS EPITHELIAL CELLS: 10 /HPF
TIBC SERPL-MCNC: 346 UG/DL
TRIGL SERPL-MCNC: 143 MG/DL
TSH SERPL-ACNC: 2.57 UIU/ML
UIBC SERPL-MCNC: 258 UG/DL
UROBILINOGEN URINE: NORMAL
UROBILINOGEN URINE: NORMAL
WBC # FLD AUTO: 6.11 K/UL
WHITE BLOOD CELLS URINE: 1 /HPF

## 2021-02-16 ENCOUNTER — APPOINTMENT (OUTPATIENT)
Dept: RHEUMATOLOGY | Facility: CLINIC | Age: 46
End: 2021-02-16
Payer: COMMERCIAL

## 2021-02-16 VITALS
DIASTOLIC BLOOD PRESSURE: 81 MMHG | HEIGHT: 64.37 IN | SYSTOLIC BLOOD PRESSURE: 126 MMHG | TEMPERATURE: 98.6 F | WEIGHT: 252.19 LBS | OXYGEN SATURATION: 98 % | HEART RATE: 87 BPM | BODY MASS INDEX: 43.05 KG/M2

## 2021-02-16 DIAGNOSIS — R70.0 ELEVATED ERYTHROCYTE SEDIMENTATION RATE: ICD-10-CM

## 2021-02-16 DIAGNOSIS — M25.60 STIFFNESS OF UNSPECIFIED JOINT, NOT ELSEWHERE CLASSIFIED: ICD-10-CM

## 2021-02-16 DIAGNOSIS — M25.50 PAIN IN UNSPECIFIED JOINT: ICD-10-CM

## 2021-02-16 PROCEDURE — 99072 ADDL SUPL MATRL&STAF TM PHE: CPT

## 2021-02-16 PROCEDURE — 99204 OFFICE O/P NEW MOD 45 MIN: CPT | Mod: 25

## 2021-02-19 PROBLEM — M25.50 ARTHRALGIA OF MULTIPLE JOINTS: Status: ACTIVE | Noted: 2021-02-19

## 2021-02-19 NOTE — CONSULT LETTER
[Dear  ___] : Dear  [unfilled], [Please see my note below.] : Please see my note below. [Sincerely,] : Sincerely, [FreeTextEntry3] : Светлана Franco MD\par , [facility] at [facility]\par Division of Rheumatology\par

## 2021-02-19 NOTE — ASSESSMENT
[FreeTextEntry1] : #Generalized arthralgias\par #Low back pain and leg pain\par r.o sciatica \par \par -Blood work done with Dr Simons reviewed with patient: RF/CCP negative, ESR 54\par \par Recommend:\par -Obtain CRP\par -check RAUL screen \par -Add hep panel, SPEP/IF\par \par Fup after above\par \par \par Patient aware of my assessment and plan, all questions answered.\par

## 2021-02-19 NOTE — PHYSICAL EXAM
[General Appearance - Alert] : alert [General Appearance - In No Acute Distress] : in no acute distress [Sclera] : the sclera and conjunctiva were normal [Respiration, Rhythm And Depth] : normal respiratory rhythm and effort [Musculoskeletal - Swelling] : no joint swelling seen [No Focal Deficits] : no focal deficits [Oriented To Time, Place, And Person] : oriented to person, place, and time

## 2021-02-19 NOTE — HISTORY OF PRESENT ILLNESS
[FreeTextEntry1] : 46 yo W, referred for evaluation of joint pain. 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

## 2021-02-22 LAB
ALBUMIN MFR SERPL ELPH: 60.9 %
ALBUMIN SERPL-MCNC: 4.3 G/DL
ALBUMIN/GLOB SERPL: 1.6 RATIO
ALBUPE: 18.3 %
ALPHA1 GLOB MFR SERPL ELPH: 4 %
ALPHA1 GLOB SERPL ELPH-MCNC: 0.3 G/DL
ALPHA1UPE: 34.2 %
ALPHA2 GLOB MFR SERPL ELPH: 9.6 %
ALPHA2 GLOB SERPL ELPH-MCNC: 0.7 G/DL
ALPHA2UPE: 20.8 %
ANA SER IF-ACNC: NEGATIVE
B-GLOBULIN MFR SERPL ELPH: 12.8 %
B-GLOBULIN SERPL ELPH-MCNC: 0.9 G/DL
BETAUPE: 12 %
CREAT 24H UR-MCNC: NORMAL G/24 H
CREATININE UR (MAYO): 71 MG/DL
CRP SERPL-MCNC: 0.33 MG/DL
ENA SS-A AB SER IA-ACNC: <0.2 AL
ENA SS-B AB SER IA-ACNC: <0.2 AL
GAMMA GLOB FLD ELPH-MCNC: 0.9 G/DL
GAMMA GLOB MFR SERPL ELPH: 12.7 %
GAMMAUPE: 14.7 %
HBV CORE IGG+IGM SER QL: NONREACTIVE
HBV SURFACE AG SER QL: NONREACTIVE
HCV AB SER QL: NONREACTIVE
HCV S/CO RATIO: 0.1 S/CO
IGA 24H UR QL IFE: NORMAL
INTERPRETATION SERPL IEP-IMP: NORMAL
KAPPA LC 24H UR QL: NORMAL
M PROTEIN SPEC IFE-MCNC: NORMAL
PROT PATTERN 24H UR ELPH-IMP: NORMAL
PROT SERPL-MCNC: 7 G/DL
PROT SERPL-MCNC: 7 G/DL
PROT UR-MCNC: 5 MG/DL
PROT UR-MCNC: 5 MG/DL
SPECIMEN VOL 24H UR: NORMAL ML
THYROPEROXIDASE AB SERPL IA-ACNC: 13.9 IU/ML

## 2021-03-09 ENCOUNTER — NON-APPOINTMENT (OUTPATIENT)
Age: 46
End: 2021-03-09

## 2021-07-06 ENCOUNTER — NON-APPOINTMENT (OUTPATIENT)
Age: 46
End: 2021-07-06

## 2021-07-12 ENCOUNTER — NON-APPOINTMENT (OUTPATIENT)
Age: 46
End: 2021-07-12

## 2022-01-10 ENCOUNTER — APPOINTMENT (OUTPATIENT)
Dept: INTERNAL MEDICINE | Facility: CLINIC | Age: 47
End: 2022-01-10
Payer: COMMERCIAL

## 2022-01-10 ENCOUNTER — NON-APPOINTMENT (OUTPATIENT)
Age: 47
End: 2022-01-10

## 2022-01-10 VITALS
BODY MASS INDEX: 41.36 KG/M2 | SYSTOLIC BLOOD PRESSURE: 111 MMHG | HEIGHT: 64.57 IN | WEIGHT: 245.25 LBS | HEART RATE: 64 BPM | OXYGEN SATURATION: 98 % | TEMPERATURE: 98 F | DIASTOLIC BLOOD PRESSURE: 77 MMHG

## 2022-01-10 DIAGNOSIS — M26.609 UNSPECIFIED TEMPOROMANDIBULAR JOINT DISORDER: ICD-10-CM

## 2022-01-10 PROCEDURE — 99396 PREV VISIT EST AGE 40-64: CPT | Mod: 25

## 2022-01-11 NOTE — HEALTH RISK ASSESSMENT
[Good] : ~his/her~  mood as  good [FreeTextEntry1] : Health maintenance [No] : No [0] : 2) Feeling down, depressed, or hopeless: Not at all (0) [PHQ-2 Negative - No further assessment needed] : PHQ-2 Negative - No further assessment needed [de-identified] : Headache weight loss

## 2022-01-11 NOTE — HISTORY OF PRESENT ILLNESS
[FreeTextEntry1] : Patient presents to the office for annual physical [de-identified] : Feels well has had difficulty losing weight\par Does has a headache, radiates up to the left ear left jawline also has a history of migraines denies any nausea vomiting photophobia sensitivity\par Last for about a day alleviated by Advil\par Denies any chest pain chest tightness shortness of breath lower extremity edema orthopnea paroxysmal nocturnal dyspnea\par Does have a tendency to grind teeth\par Has follow-up mammogram scheduled for this Friday

## 2022-01-11 NOTE — PHYSICAL EXAM
[Normal] : affect was normal and insight and judgment were intact [de-identified] : masseter muscle spasm

## 2022-01-11 NOTE — ASSESSMENT
[FreeTextEntry1] : Blood work done today, following with endocrinology for thyroid function tests\par Weight loss management referral given to discuss with nutritionist\par Suspect headaches may be contributed by TMJ physical therapy referral ordered patient does have masseter muscle spasm

## 2022-01-15 LAB
25(OH)D3 SERPL-MCNC: 24.4 NG/ML
ALBUMIN SERPL ELPH-MCNC: 4.6 G/DL
ALP BLD-CCNC: 84 U/L
ALT SERPL-CCNC: 17 U/L
ANION GAP SERPL CALC-SCNC: 11 MMOL/L
APPEARANCE: CLEAR
APPEARANCE: CLEAR
AST SERPL-CCNC: 14 U/L
BACTERIA: NEGATIVE
BASOPHILS # BLD AUTO: 0.05 K/UL
BASOPHILS NFR BLD AUTO: 1 %
BILIRUB SERPL-MCNC: 0.3 MG/DL
BILIRUBIN URINE: NEGATIVE
BILIRUBIN URINE: NEGATIVE
BLOOD URINE: NEGATIVE
BLOOD URINE: NEGATIVE
BUN SERPL-MCNC: 17 MG/DL
CALCIUM SERPL-MCNC: 9.5 MG/DL
CHLORIDE SERPL-SCNC: 103 MMOL/L
CHOLEST SERPL-MCNC: 237 MG/DL
CO2 SERPL-SCNC: 25 MMOL/L
COLOR: NORMAL
COLOR: NORMAL
CREAT SERPL-MCNC: 0.85 MG/DL
EOSINOPHIL # BLD AUTO: 0.14 K/UL
EOSINOPHIL NFR BLD AUTO: 2.7 %
ESTIMATED AVERAGE GLUCOSE: 111 MG/DL
GLUCOSE QUALITATIVE U: NEGATIVE
GLUCOSE QUALITATIVE U: NEGATIVE
GLUCOSE SERPL-MCNC: 89 MG/DL
HBA1C MFR BLD HPLC: 5.5 %
HCT VFR BLD CALC: 43.1 %
HDLC SERPL-MCNC: 67 MG/DL
HGB BLD-MCNC: 12.6 G/DL
HYALINE CASTS: 0 /LPF
IMM GRANULOCYTES NFR BLD AUTO: 0.2 %
KETONES URINE: NEGATIVE
KETONES URINE: NEGATIVE
LDLC SERPL CALC-MCNC: 144 MG/DL
LEUKOCYTE ESTERASE URINE: NEGATIVE
LEUKOCYTE ESTERASE URINE: NEGATIVE
LYMPHOCYTES # BLD AUTO: 1.25 K/UL
LYMPHOCYTES NFR BLD AUTO: 24 %
MAN DIFF?: NORMAL
MCHC RBC-ENTMCNC: 20.8 PG
MCHC RBC-ENTMCNC: 29.2 GM/DL
MCV RBC AUTO: 71 FL
MICROSCOPIC-UA: NORMAL
MONOCYTES # BLD AUTO: 0.51 K/UL
MONOCYTES NFR BLD AUTO: 9.8 %
NEUTROPHILS # BLD AUTO: 3.25 K/UL
NEUTROPHILS NFR BLD AUTO: 62.3 %
NITRITE URINE: NEGATIVE
NITRITE URINE: NEGATIVE
NONHDLC SERPL-MCNC: 170 MG/DL
PH URINE: 6
PH URINE: 6
PLATELET # BLD AUTO: 388 K/UL
POTASSIUM SERPL-SCNC: 4.6 MMOL/L
PROT SERPL-MCNC: 6.7 G/DL
PROTEIN URINE: NEGATIVE
PROTEIN URINE: NEGATIVE
RBC # BLD: 6.07 M/UL
RBC # FLD: 17.2 %
RED BLOOD CELLS URINE: 1 /HPF
SODIUM SERPL-SCNC: 139 MMOL/L
SPECIFIC GRAVITY URINE: 1.02
SPECIFIC GRAVITY URINE: 1.02
SQUAMOUS EPITHELIAL CELLS: 2 /HPF
TRIGL SERPL-MCNC: 133 MG/DL
TSH SERPL-ACNC: 1.81 UIU/ML
UROBILINOGEN URINE: NORMAL
UROBILINOGEN URINE: NORMAL
VIT B12 SERPL-MCNC: 362 PG/ML
WBC # FLD AUTO: 5.21 K/UL
WHITE BLOOD CELLS URINE: 0 /HPF

## 2022-04-04 ENCOUNTER — RESULT REVIEW (OUTPATIENT)
Age: 47
End: 2022-04-04

## 2022-04-11 PROBLEM — Z11.59 SCREENING FOR VIRAL DISEASE: Status: ACTIVE | Noted: 2021-01-05

## 2022-08-17 NOTE — REASON FOR VISIT
----- Message from Lino Pastor sent at 8/17/2022 12:32 PM EDT -----  Subject: Message to Provider    QUESTIONS  Information for Provider? wants to know if this is the same building that   Dr. Rylee Rodriguez was in. She has an appt in September  ---------------------------------------------------------------------------  --------------  [id number]  [phone]; OK to leave message on voicemail  ---------------------------------------------------------------------------  --------------  SCRIPT ANSWERS  Relationship to Patient?  Self [Follow-Up: _____] : a [unfilled] follow-up visit

## 2022-09-20 ENCOUNTER — LABORATORY RESULT (OUTPATIENT)
Age: 47
End: 2022-09-20

## 2022-09-20 ENCOUNTER — APPOINTMENT (OUTPATIENT)
Dept: INTERNAL MEDICINE | Facility: CLINIC | Age: 47
End: 2022-09-20

## 2022-09-20 VITALS
HEIGHT: 65 IN | WEIGHT: 225 LBS | TEMPERATURE: 97.1 F | BODY MASS INDEX: 37.49 KG/M2 | SYSTOLIC BLOOD PRESSURE: 107 MMHG | DIASTOLIC BLOOD PRESSURE: 73 MMHG | OXYGEN SATURATION: 99 % | HEART RATE: 69 BPM

## 2022-09-20 DIAGNOSIS — N94.9 UNSPECIFIED CONDITION ASSOCIATED WITH FEMALE GENITAL ORGANS AND MENSTRUAL CYCLE: ICD-10-CM

## 2022-09-20 DIAGNOSIS — R20.2 PARESTHESIA OF SKIN: ICD-10-CM

## 2022-09-20 DIAGNOSIS — R25.2 CRAMP AND SPASM: ICD-10-CM

## 2022-09-20 PROCEDURE — 36415 COLL VENOUS BLD VENIPUNCTURE: CPT

## 2022-09-20 PROCEDURE — 99214 OFFICE O/P EST MOD 30 MIN: CPT | Mod: 25

## 2022-09-20 NOTE — ASSESSMENT
[FreeTextEntry1] : -Advised pt to do Neuro Evaluation given increased frequency of urination and numbness.\par -Check TFTs, Vit D levels.\par -Pelvic US ordered for Pelvic discomfort. Pt follows with GYN annually.\par -Check A1c, lipid panel for HLD.\par -check electrolytes for cramps

## 2022-09-20 NOTE — ADDENDUM
[FreeTextEntry1] : I, Marco Bourne, acted as a scribe on behalf of Dr. Mao Simons MD, on 09/20/2022. \par \par All medical entries made by the scribe were at my, Dr. Mao Simons MD, direction and personally dictated by me on 09/20/2022. I have reviewed the chart and agree that the record accurately reflects my personal performance of the history, physical exam, assessment and plan. I have also personally directed, reviewed, and agreed with the chart.

## 2022-09-20 NOTE — HISTORY OF PRESENT ILLNESS
[FreeTextEntry1] : Patient presents for follow-up.  [de-identified] : Patient wishes to check Thyroid.\par Pt has been having pelvic sensation. Went to GI and everything stable.\par Denies any nausea, vomiting or change in bowel habits.\par Does have some joint discomfort. had Rheum evaluation.\par Sometimes gets numbness on her feet and hands.\par Denies any weakness, double vision.\par Also notes of increased frequency about 2-3 x night to urinate.\par Follows with GYN annually, had ablation of fibroids and no longer have menstrual cycles

## 2022-09-20 NOTE — REVIEW OF SYSTEMS
[Negative] : Heme/Lymph [Nocturia] : nocturia [FreeTextEntry8] : increased frequency [FreeTextEntry9] : Pelvic sensation, numbness of feet and hands, Joint discomfort

## 2022-09-23 LAB
ALBUMIN SERPL ELPH-MCNC: 4.6 G/DL
ALP BLD-CCNC: 58 U/L
ALT SERPL-CCNC: 13 U/L
ANION GAP SERPL CALC-SCNC: 12 MMOL/L
AST SERPL-CCNC: 15 U/L
BASOPHILS # BLD AUTO: 0 K/UL
BASOPHILS NFR BLD AUTO: 0 %
BILIRUB SERPL-MCNC: 0.2 MG/DL
BUN SERPL-MCNC: 14 MG/DL
CALCIUM SERPL-MCNC: 10 MG/DL
CHLORIDE SERPL-SCNC: 105 MMOL/L
CHOLEST SERPL-MCNC: 207 MG/DL
CO2 SERPL-SCNC: 24 MMOL/L
CREAT SERPL-MCNC: 0.83 MG/DL
EGFR: 87 ML/MIN/1.73M2
EOSINOPHIL # BLD AUTO: 0 K/UL
EOSINOPHIL NFR BLD AUTO: 0 %
ESTIMATED AVERAGE GLUCOSE: 114 MG/DL
FOLATE SERPL-MCNC: 9 NG/ML
GLUCOSE SERPL-MCNC: 96 MG/DL
HBA1C MFR BLD HPLC: 5.6 %
HCT VFR BLD CALC: 41.1 %
HDLC SERPL-MCNC: 73 MG/DL
HGB BLD-MCNC: 12.6 G/DL
LDLC SERPL CALC-MCNC: 120 MG/DL
LYMPHOCYTES # BLD AUTO: 0.66 K/UL
LYMPHOCYTES NFR BLD AUTO: 10.4 %
MAGNESIUM SERPL-MCNC: 1.9 MG/DL
MAN DIFF?: NORMAL
MCHC RBC-ENTMCNC: 21.4 PG
MCHC RBC-ENTMCNC: 30.7 GM/DL
MCV RBC AUTO: 69.7 FL
MONOCYTES # BLD AUTO: 0.11 K/UL
MONOCYTES NFR BLD AUTO: 1.7 %
NEUTROPHILS # BLD AUTO: 5.56 K/UL
NEUTROPHILS NFR BLD AUTO: 87 %
NONHDLC SERPL-MCNC: 134 MG/DL
PHOSPHATE SERPL-MCNC: 3.3 MG/DL
PLATELET # BLD AUTO: 345 K/UL
POTASSIUM SERPL-SCNC: 4.5 MMOL/L
PROT SERPL-MCNC: 6.8 G/DL
RBC # BLD: 5.9 M/UL
RBC # FLD: 15.9 %
SODIUM SERPL-SCNC: 141 MMOL/L
T4 FREE SERPL-MCNC: 1.4 NG/DL
THYROGLOB AB SERPL-ACNC: <20 IU/ML
THYROPEROXIDASE AB SERPL IA-ACNC: 14.2 IU/ML
TRIGL SERPL-MCNC: 71 MG/DL
TSH SERPL-ACNC: 1.45 UIU/ML
VIT B12 SERPL-MCNC: 230 PG/ML
WBC # FLD AUTO: 6.33 K/UL

## 2022-09-23 RX ORDER — CYANOCOBALAMIN (VITAMIN B-12) 2000 MCG
2000 TABLET, EXTENDED RELEASE ORAL DAILY
Qty: 90 | Refills: 3 | Status: ACTIVE | COMMUNITY
Start: 2022-09-23 | End: 1900-01-01

## 2022-09-30 ENCOUNTER — NON-APPOINTMENT (OUTPATIENT)
Age: 47
End: 2022-09-30

## 2022-11-08 ENCOUNTER — APPOINTMENT (OUTPATIENT)
Dept: INTERNAL MEDICINE | Facility: CLINIC | Age: 47
End: 2022-11-08

## 2022-11-08 PROCEDURE — 96372 THER/PROPH/DIAG INJ SC/IM: CPT

## 2022-11-15 ENCOUNTER — APPOINTMENT (OUTPATIENT)
Dept: INTERNAL MEDICINE | Facility: CLINIC | Age: 47
End: 2022-11-15

## 2022-11-15 PROCEDURE — 96372 THER/PROPH/DIAG INJ SC/IM: CPT

## 2022-11-15 RX ORDER — CYANOCOBALAMIN 1000 UG/ML
1000 INJECTION INTRAMUSCULAR; SUBCUTANEOUS
Refills: 0 | Status: COMPLETED | COMMUNITY
End: 2022-11-15

## 2022-11-15 RX ORDER — CYANOCOBALAMIN 1000 UG/ML
1000 INJECTION INTRAMUSCULAR; SUBCUTANEOUS
Qty: 1 | Refills: 0 | Status: COMPLETED | COMMUNITY
Start: 2022-11-15 | End: 2022-11-15

## 2022-11-22 ENCOUNTER — APPOINTMENT (OUTPATIENT)
Dept: INTERNAL MEDICINE | Facility: CLINIC | Age: 47
End: 2022-11-22

## 2022-11-22 PROCEDURE — 96372 THER/PROPH/DIAG INJ SC/IM: CPT

## 2022-11-22 RX ORDER — CYANOCOBALAMIN 1000 UG/ML
1000 INJECTION INTRAMUSCULAR; SUBCUTANEOUS
Qty: 0 | Refills: 0 | Status: COMPLETED | OUTPATIENT
Start: 2022-11-22

## 2022-11-22 RX ADMIN — CYANOCOBALAMIN 0 MCG/ML: 1000 INJECTION INTRAMUSCULAR; SUBCUTANEOUS at 00:00

## 2022-11-27 ENCOUNTER — MED ADMIN CHARGE (OUTPATIENT)
Age: 47
End: 2022-11-27

## 2022-11-27 RX ORDER — CYANOCOBALAMIN 1000 UG/ML
1000 INJECTION INTRAMUSCULAR; SUBCUTANEOUS
Qty: 0 | Refills: 0 | Status: COMPLETED | OUTPATIENT
Start: 2022-11-27

## 2022-11-27 RX ADMIN — CYANOCOBALAMIN 0 MCG/ML: 1000 INJECTION INTRAMUSCULAR; SUBCUTANEOUS at 00:00

## 2022-11-29 ENCOUNTER — APPOINTMENT (OUTPATIENT)
Dept: INTERNAL MEDICINE | Facility: CLINIC | Age: 47
End: 2022-11-29

## 2022-11-29 PROCEDURE — 96372 THER/PROPH/DIAG INJ SC/IM: CPT

## 2022-11-29 RX ORDER — CYANOCOBALAMIN 1000 UG/ML
1000 INJECTION INTRAMUSCULAR; SUBCUTANEOUS
Qty: 0 | Refills: 0 | Status: COMPLETED | OUTPATIENT
Start: 2022-11-29

## 2022-11-29 RX ADMIN — CYANOCOBALAMIN 0 MCG/ML: 1000 INJECTION, SOLUTION INTRAMUSCULAR at 00:00

## 2022-12-03 LAB — VIT B12 SERPL-MCNC: >2000 PG/ML

## 2022-12-20 DIAGNOSIS — N83.209 UNSPECIFIED OVARIAN CYST, UNSPECIFIED SIDE: ICD-10-CM

## 2023-03-13 ENCOUNTER — APPOINTMENT (OUTPATIENT)
Dept: INTERNAL MEDICINE | Facility: CLINIC | Age: 48
End: 2023-03-13
Payer: COMMERCIAL

## 2023-03-13 ENCOUNTER — NON-APPOINTMENT (OUTPATIENT)
Age: 48
End: 2023-03-13

## 2023-03-13 VITALS
HEIGHT: 65 IN | HEART RATE: 71 BPM | DIASTOLIC BLOOD PRESSURE: 82 MMHG | SYSTOLIC BLOOD PRESSURE: 119 MMHG | OXYGEN SATURATION: 98 % | TEMPERATURE: 98 F | BODY MASS INDEX: 38.99 KG/M2 | WEIGHT: 234 LBS

## 2023-03-13 DIAGNOSIS — K22.70 BARRETT'S ESOPHAGUS W/OUT DYSPLASIA: ICD-10-CM

## 2023-03-13 DIAGNOSIS — E53.8 DEFICIENCY OF OTHER SPECIFIED B GROUP VITAMINS: ICD-10-CM

## 2023-03-13 DIAGNOSIS — G43.909 MIGRAINE, UNSPECIFIED, NOT INTRACTABLE, W/OUT STATUS MIGRAINOSUS: ICD-10-CM

## 2023-03-13 DIAGNOSIS — R53.83 OTHER FATIGUE: ICD-10-CM

## 2023-03-13 PROCEDURE — 99396 PREV VISIT EST AGE 40-64: CPT | Mod: 25

## 2023-03-13 PROCEDURE — 99214 OFFICE O/P EST MOD 30 MIN: CPT | Mod: 25

## 2023-03-13 NOTE — ASSESSMENT
[FreeTextEntry1] : Blood work done today, fatigue may be secondary to sleep apnea insurance did not cover sleep study will order to pulmonology, given the progressively worsening migraine with neurologic features MRI ordered to rule out space-occupying lesion.  Discussed possibility of Nurtec Ubrelvy patient currently hesitant, fatigue with muscle aches may be secondary to fibromyalgia, discussed possibility of starting Cymbalta currently deferred, will reassess autoimmune panel arthritis possibly contributing.

## 2023-03-13 NOTE — PHYSICAL EXAM
[Normal] : affect was normal and insight and judgment were intact [de-identified] : Tender points in the shoulders back thighs

## 2023-03-13 NOTE — HEALTH RISK ASSESSMENT
[Good] : ~his/her~  mood as  good [FreeTextEntry1] : health maintenance [de-identified] : gynecology

## 2023-03-13 NOTE — REVIEW OF SYSTEMS
[Joint Swelling] : joint swelling [Muscle Pain] : muscle pain [Headache] : headache [Negative] : Heme/Lymph

## 2023-03-13 NOTE — HISTORY OF PRESENT ILLNESS
[FreeTextEntry1] : Patient presents for annual physical [de-identified] : That she has been having fatigue muscle aches joint pains since having COVID, did have rheumatology consultation last year that did not show any pathology.  Does have migraines have been progressively worsening 3-4 times a week, associated with paresthesias.  Has tried triptans with no alleviation, notes triggers are stress caffeine intake.  Does have some snoring and wakes up feeling unrefreshed.  Sometimes wakes up with a headache denies any weakness.  Denies any joint swelling. Has appointment with gynecology next week.\par Diagnosed with Alvares's esophagus, on PPI therapy watching diet.

## 2023-03-17 DIAGNOSIS — R79.89 OTHER SPECIFIED ABNORMAL FINDINGS OF BLOOD CHEMISTRY: ICD-10-CM

## 2023-03-17 LAB
25(OH)D3 SERPL-MCNC: 22.2 NG/ML
ALBUMIN SERPL ELPH-MCNC: 4.6 G/DL
ALP BLD-CCNC: 67 U/L
ALT SERPL-CCNC: 23 U/L
ANA SER IF-ACNC: NEGATIVE
ANION GAP SERPL CALC-SCNC: 11 MMOL/L
APPEARANCE: CLEAR
AST SERPL-CCNC: 16 U/L
BACTERIA: NEGATIVE
BASOPHILS # BLD AUTO: 0.06 K/UL
BASOPHILS NFR BLD AUTO: 1.1 %
BILIRUB SERPL-MCNC: 0.4 MG/DL
BILIRUBIN URINE: NEGATIVE
BLOOD URINE: NEGATIVE
BUN SERPL-MCNC: 18 MG/DL
CALCIUM SERPL-MCNC: 9.9 MG/DL
CHLORIDE SERPL-SCNC: 102 MMOL/L
CHOLEST SERPL-MCNC: 239 MG/DL
CK SERPL-CCNC: 74 U/L
CO2 SERPL-SCNC: 25 MMOL/L
COLOR: NORMAL
CREAT SERPL-MCNC: 0.87 MG/DL
CRP SERPL-MCNC: <3 MG/L
EGFR: 82 ML/MIN/1.73M2
EOSINOPHIL # BLD AUTO: 0.22 K/UL
EOSINOPHIL NFR BLD AUTO: 4 %
ERYTHROCYTE [SEDIMENTATION RATE] IN BLOOD BY WESTERGREN METHOD: 18 MM/HR
ESTIMATED AVERAGE GLUCOSE: 114 MG/DL
GLUCOSE QUALITATIVE U: NEGATIVE
GLUCOSE SERPL-MCNC: 95 MG/DL
HBA1C MFR BLD HPLC: 5.6 %
HCT VFR BLD CALC: 41.7 %
HDLC SERPL-MCNC: 79 MG/DL
HGB BLD-MCNC: 12.7 G/DL
HYALINE CASTS: 1 /LPF
IMM GRANULOCYTES NFR BLD AUTO: 0.2 %
KETONES URINE: NEGATIVE
LDLC SERPL CALC-MCNC: 135 MG/DL
LEUKOCYTE ESTERASE URINE: NEGATIVE
LYMPHOCYTES # BLD AUTO: 1.24 K/UL
LYMPHOCYTES NFR BLD AUTO: 22.5 %
MAN DIFF?: NORMAL
MCHC RBC-ENTMCNC: 21.5 PG
MCHC RBC-ENTMCNC: 30.5 GM/DL
MCV RBC AUTO: 70.4 FL
MICROSCOPIC-UA: NORMAL
MONOCYTES # BLD AUTO: 0.53 K/UL
MONOCYTES NFR BLD AUTO: 9.6 %
NEUTROPHILS # BLD AUTO: 3.45 K/UL
NEUTROPHILS NFR BLD AUTO: 62.6 %
NITRITE URINE: NEGATIVE
NONHDLC SERPL-MCNC: 160 MG/DL
PH URINE: 6.5
PLATELET # BLD AUTO: 385 K/UL
POTASSIUM SERPL-SCNC: 4.5 MMOL/L
PROT SERPL-MCNC: 7 G/DL
PROTEIN URINE: NORMAL
RBC # BLD: 5.92 M/UL
RBC # FLD: 16.2 %
RED BLOOD CELLS URINE: 2 /HPF
SODIUM SERPL-SCNC: 138 MMOL/L
SPECIFIC GRAVITY URINE: 1.03
SQUAMOUS EPITHELIAL CELLS: 3 /HPF
TRIGL SERPL-MCNC: 127 MG/DL
TSH SERPL-ACNC: 2.19 UIU/ML
UROBILINOGEN URINE: NORMAL
VIT B12 SERPL-MCNC: 497 PG/ML
WBC # FLD AUTO: 5.51 K/UL
WHITE BLOOD CELLS URINE: 0 /HPF

## 2023-05-15 ENCOUNTER — APPOINTMENT (OUTPATIENT)
Dept: CARDIOLOGY | Facility: CLINIC | Age: 48
End: 2023-05-15
Payer: COMMERCIAL

## 2023-05-15 VITALS
RESPIRATION RATE: 12 BRPM | HEART RATE: 82 BPM | HEIGHT: 65 IN | DIASTOLIC BLOOD PRESSURE: 83 MMHG | WEIGHT: 240 LBS | SYSTOLIC BLOOD PRESSURE: 126 MMHG | BODY MASS INDEX: 39.99 KG/M2 | OXYGEN SATURATION: 99 %

## 2023-05-15 DIAGNOSIS — R07.89 OTHER CHEST PAIN: ICD-10-CM

## 2023-05-15 DIAGNOSIS — E78.5 HYPERLIPIDEMIA, UNSPECIFIED: ICD-10-CM

## 2023-05-15 PROCEDURE — 99203 OFFICE O/P NEW LOW 30 MIN: CPT

## 2023-05-15 NOTE — HISTORY OF PRESENT ILLNESS
[FreeTextEntry1] : Mary is a 47-year-old female who presents with very strong FH CAD. After second COVID  vaccine felt like her heart was being crushed at night. Stopped smoking 2-3 years ago. The symptoms lasted about 10 minutes with SOB. Not going to gym secondary to mortons neuroma on foot.

## 2023-05-15 NOTE — DISCUSSION/SUMMARY
[FreeTextEntry1] : The patient is a 47-year-old female ex-smoker, _FH with atypical chest pain and SOB. \par #1 CV- normal ECG, ECHO and exercise stress test, consider cardiac CT\par #2 HLD- mild increase, We discussed adherence to a Mediterranean diet, weight loss and at least 30 minutes of daily exercise.\par

## 2023-05-15 NOTE — REVIEW OF SYSTEMS
[SOB] : shortness of breath [Chest Discomfort] : chest discomfort [Palpitations] : palpitations [Negative] : Heme/Lymph [Dyspnea on exertion] : not dyspnea during exertion

## 2023-06-03 ENCOUNTER — RX RENEWAL (OUTPATIENT)
Age: 48
End: 2023-06-03

## 2023-06-09 ENCOUNTER — APPOINTMENT (OUTPATIENT)
Dept: OTOLARYNGOLOGY | Facility: CLINIC | Age: 48
End: 2023-06-09
Payer: COMMERCIAL

## 2023-06-09 VITALS
HEIGHT: 65 IN | WEIGHT: 240 LBS | SYSTOLIC BLOOD PRESSURE: 133 MMHG | DIASTOLIC BLOOD PRESSURE: 71 MMHG | HEART RATE: 81 BPM | BODY MASS INDEX: 39.99 KG/M2

## 2023-06-09 DIAGNOSIS — K11.8 OTHER DISEASES OF SALIVARY GLANDS: ICD-10-CM

## 2023-06-09 DIAGNOSIS — G50.1 ATYPICAL FACIAL PAIN: ICD-10-CM

## 2023-06-09 PROCEDURE — 31231 NASAL ENDOSCOPY DX: CPT

## 2023-06-09 PROCEDURE — 99204 OFFICE O/P NEW MOD 45 MIN: CPT | Mod: 25

## 2023-06-09 RX ORDER — OMEPRAZOLE 20 MG/1
20 TABLET, DELAYED RELEASE ORAL
Refills: 0 | Status: ACTIVE | COMMUNITY

## 2023-06-09 NOTE — PHYSICAL EXAM
[Midline] : trachea located in midline position [Normal] : no rashes [de-identified] : subcentimeter L parotid nodule

## 2023-06-09 NOTE — HISTORY OF PRESENT ILLNESS
[de-identified] : PT is self referred for a lump on her Left neck that has been present for the last few days. PT takes motrin and the bump goes down.  Pt c/o L cheek, earlobe pain.  No scans or biopsies done.  No fever, weakness, weight loss. Lump is not associated with eating.

## 2023-06-13 ENCOUNTER — APPOINTMENT (OUTPATIENT)
Dept: OTOLARYNGOLOGY | Facility: CLINIC | Age: 48
End: 2023-06-13

## 2023-06-19 ENCOUNTER — APPOINTMENT (OUTPATIENT)
Dept: ULTRASOUND IMAGING | Facility: CLINIC | Age: 48
End: 2023-06-19

## 2023-07-11 ENCOUNTER — APPOINTMENT (OUTPATIENT)
Dept: CARDIOLOGY | Facility: CLINIC | Age: 48
End: 2023-07-11
Payer: COMMERCIAL

## 2023-07-11 PROCEDURE — 93306 TTE W/DOPPLER COMPLETE: CPT

## 2023-07-11 PROCEDURE — 93015 CV STRESS TEST SUPVJ I&R: CPT

## 2023-07-12 DIAGNOSIS — R94.39 ABNORMAL RESULT OF OTHER CARDIOVASCULAR FUNCTION STUDY: ICD-10-CM

## 2023-07-24 DIAGNOSIS — T75.3XXA MOTION SICKNESS, INITIAL ENCOUNTER: ICD-10-CM

## 2023-07-24 RX ORDER — SCOPOLAMINE 1.5 MG/1
1 PATCH, EXTENDED RELEASE TRANSDERMAL
Qty: 1 | Refills: 3 | Status: ACTIVE | COMMUNITY
Start: 2023-07-24 | End: 1900-01-01

## 2023-08-14 ENCOUNTER — APPOINTMENT (OUTPATIENT)
Dept: CT IMAGING | Facility: CLINIC | Age: 48
End: 2023-08-14
Payer: COMMERCIAL

## 2023-08-14 ENCOUNTER — OUTPATIENT (OUTPATIENT)
Dept: OUTPATIENT SERVICES | Facility: HOSPITAL | Age: 48
LOS: 1 days | End: 2023-08-14
Payer: COMMERCIAL

## 2023-08-14 DIAGNOSIS — R94.39 ABNORMAL RESULT OF OTHER CARDIOVASCULAR FUNCTION STUDY: ICD-10-CM

## 2023-08-14 PROCEDURE — 75574 CT ANGIO HRT W/3D IMAGE: CPT

## 2023-08-14 PROCEDURE — 75574 CT ANGIO HRT W/3D IMAGE: CPT | Mod: 26

## 2024-01-16 ENCOUNTER — APPOINTMENT (OUTPATIENT)
Dept: PODIATRY | Facility: CLINIC | Age: 49
End: 2024-01-16
Payer: COMMERCIAL

## 2024-01-16 DIAGNOSIS — K76.0 FATTY (CHANGE OF) LIVER, NOT ELSEWHERE CLASSIFIED: ICD-10-CM

## 2024-01-16 DIAGNOSIS — M77.42 METATARSALGIA, LEFT FOOT: ICD-10-CM

## 2024-01-16 DIAGNOSIS — Z78.0 ASYMPTOMATIC MENOPAUSAL STATE: ICD-10-CM

## 2024-01-16 DIAGNOSIS — M79.673 PAIN IN UNSPECIFIED FOOT: ICD-10-CM

## 2024-01-16 DIAGNOSIS — Z83.3 FAMILY HISTORY OF DIABETES MELLITUS: ICD-10-CM

## 2024-01-16 DIAGNOSIS — G57.62 LESION OF PLANTAR NERVE, LEFT LOWER LIMB: ICD-10-CM

## 2024-01-16 PROCEDURE — 99204 OFFICE O/P NEW MOD 45 MIN: CPT | Mod: 25

## 2024-01-16 PROCEDURE — 73630 X-RAY EXAM OF FOOT: CPT | Mod: LT

## 2024-01-16 RX ORDER — FAMOTIDINE 10 MG/1
TABLET, FILM COATED ORAL
Refills: 0 | Status: ACTIVE | COMMUNITY

## 2024-01-18 PROBLEM — M79.673 PAIN, FOOT: Status: ACTIVE | Noted: 2024-01-17

## 2024-01-18 PROBLEM — G57.62 MORTON'S NEUROMA OF LEFT FOOT: Status: ACTIVE | Noted: 2024-01-17

## 2024-01-18 PROBLEM — M77.42 METATARSALGIA OF LEFT FOOT: Status: ACTIVE | Noted: 2024-01-17

## 2024-01-18 NOTE — PROCEDURE
[FreeTextEntry1] : X-rays were taken to evaluate for fracture or bony irregularities.  X-ray Report: (Left foot - 3 views) X-rays demonstrate moderate HAV with bunion, IM angle of 16 degrees, prominent medial eminence, hallux abductus angle with deviated sesamoid position. She has flatfoot with minor arthrosis about the 2nd and 3rd met. cuneiform joint. No sign of stress fracture.

## 2024-01-18 NOTE — PHYSICAL EXAM
[2+] : left foot dorsalis pedis 2+ [Skin Color & Pigmentation] : normal skin color and pigmentation [Skin Turgor] : normal skin turgor [] : no rash [Skin Lesions] : no skin lesions [Sensation] : the sensory exam was normal to light touch and pinprick [No Focal Deficits] : no focal deficits [Deep Tendon Reflexes (DTR)] : deep tendon reflexes were 2+ and symmetric [Motor Exam] : the motor exam was normal [Ankle Swelling (On Exam)] : not present [Varicose Veins Of Lower Extremities] : not present [Delayed in the Right Toes] : capillary refills normal in right toes [Delayed in the Left Toes] : capillary refills normal in the left toes [FreeTextEntry3] : Hair growth noted on digits. Proximal to distal cooling is within normal limits.  [de-identified] : She has HAV with bunion with moderate deformity. She has some palpable exostosis at the 2nd and 3rd met. cuneiform joint that is minimally symptomatic. The bunion is also mildly symptomatic, and she has an easily reproducible left 3rd interspace neuroma. There is positive Tinel's sign and reproducible pain at the left 3rd interspace.  There is no pain at the adjacent interspaces and there is clean MPJ ROM without irritation. [Foot Ulcer] : no foot ulcer [Skin Induration] : no skin induration

## 2024-01-18 NOTE — HISTORY OF PRESENT ILLNESS
[Sneakers] : umm [FreeTextEntry1] : Patient presents today complaining of neuroma symptomatology of her left 3rd interspace. It has been painful for 2 years and still gets as bad as 7/10. She has been wearing orthotics. She massaged it. She changes her shoes. She has had at least 3 injections, and it recurs quickly. 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 Remainder of the web spaces demonstrate no evidence of neuroma or intermetatarsal bursitis. She presents today asking about all treatment options.

## 2024-01-18 NOTE — ASSESSMENT
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 Metatarsalgia. Pain.  Treatment: She has orthotics. I added metatarsal support to destress the forefoot. I want her to give this a chance. I gave her some home therapy and stretching exercises that I think will be beneficial over time. I put her on Meloxicam once a day with food and stop all other anti-inflammatories. I would not recommend any further injections. With continued pain I discussed 3rd interspace neuroma excision. I discussed all risks, benefits and alternatives to the procedure, postoperative course and possible complications such as recurrence or infection. She will follow-up in the office with continued pain that persists.

## 2024-03-04 RX ORDER — MELOXICAM 15 MG/1
15 TABLET ORAL
Qty: 14 | Refills: 0 | Status: ACTIVE | COMMUNITY
Start: 2024-01-16 | End: 1900-01-01

## 2024-03-18 ENCOUNTER — LABORATORY RESULT (OUTPATIENT)
Age: 49
End: 2024-03-18

## 2024-03-18 ENCOUNTER — NON-APPOINTMENT (OUTPATIENT)
Age: 49
End: 2024-03-18

## 2024-03-18 ENCOUNTER — APPOINTMENT (OUTPATIENT)
Dept: INTERNAL MEDICINE | Facility: CLINIC | Age: 49
End: 2024-03-18
Payer: COMMERCIAL

## 2024-03-18 VITALS
HEIGHT: 65 IN | BODY MASS INDEX: 40.48 KG/M2 | WEIGHT: 243 LBS | OXYGEN SATURATION: 99 % | HEART RATE: 70 BPM | TEMPERATURE: 98.2 F | DIASTOLIC BLOOD PRESSURE: 79 MMHG | SYSTOLIC BLOOD PRESSURE: 116 MMHG

## 2024-03-18 DIAGNOSIS — Z00.00 ENCOUNTER FOR GENERAL ADULT MEDICAL EXAMINATION W/OUT ABNORMAL FINDINGS: ICD-10-CM

## 2024-03-18 DIAGNOSIS — E66.9 OBESITY, UNSPECIFIED: ICD-10-CM

## 2024-03-18 PROCEDURE — 93000 ELECTROCARDIOGRAM COMPLETE: CPT | Mod: 59

## 2024-03-18 PROCEDURE — 99396 PREV VISIT EST AGE 40-64: CPT | Mod: 25

## 2024-03-18 RX ORDER — PHENTERMINE HYDROCHLORIDE 15 MG/1
15 CAPSULE ORAL
Qty: 30 | Refills: 0 | Status: ACTIVE | COMMUNITY
Start: 2024-03-18 | End: 1900-01-01

## 2024-03-19 NOTE — HEALTH RISK ASSESSMENT
[Good] : ~his/her~  mood as  good [No] : In the past 12 months have you used drugs other than those required for medical reasons? No [Former] : Former [FreeTextEntry1] : Health Maintenance [de-identified] : Podiatry

## 2024-03-19 NOTE — ASSESSMENT
[FreeTextEntry1] : Annual Physical Exam -BP is stable.  -EKG unremarkable today. -Check A1c, lipid panel and Vitamin levels. -Pt to start phentermine for weight loss. Discussed importance of healthy diet and walking program. -RTO annually or as needed.

## 2024-03-19 NOTE — PHYSICAL EXAM
[No Acute Distress] : no acute distress [Well Developed] : well developed [Well Nourished] : well nourished [Well-Appearing] : well-appearing [Normal Sclera/Conjunctiva] : normal sclera/conjunctiva [EOMI] : extraocular movements intact [PERRL] : pupils equal round and reactive to light [Normal Outer Ear/Nose] : the outer ears and nose were normal in appearance [Normal Oropharynx] : the oropharynx was normal [No JVD] : no jugular venous distention [No Lymphadenopathy] : no lymphadenopathy [Supple] : supple [Thyroid Normal, No Nodules] : the thyroid was normal and there were no nodules present [No Accessory Muscle Use] : no accessory muscle use [No Respiratory Distress] : no respiratory distress  [Clear to Auscultation] : lungs were clear to auscultation bilaterally [Normal Rate] : normal rate  [Regular Rhythm] : with a regular rhythm [Normal S1, S2] : normal S1 and S2 [No Murmur] : no murmur heard [No Carotid Bruits] : no carotid bruits [No Abdominal Bruit] : a ~M bruit was not heard ~T in the abdomen [No Varicosities] : no varicosities [Pedal Pulses Present] : the pedal pulses are present [No Edema] : there was no peripheral edema [No Palpable Aorta] : no palpable aorta [Soft] : abdomen soft [Non Tender] : non-tender [No Extremity Clubbing/Cyanosis] : no extremity clubbing/cyanosis [No Masses] : no abdominal mass palpated [Non-distended] : non-distended [No HSM] : no HSM [Normal Bowel Sounds] : normal bowel sounds [Normal Posterior Cervical Nodes] : no posterior cervical lymphadenopathy [Normal Anterior Cervical Nodes] : no anterior cervical lymphadenopathy [No CVA Tenderness] : no CVA  tenderness [No Spinal Tenderness] : no spinal tenderness [No Joint Swelling] : no joint swelling [Grossly Normal Strength/Tone] : grossly normal strength/tone [Coordination Grossly Intact] : coordination grossly intact [No Rash] : no rash [Normal Gait] : normal gait [No Focal Deficits] : no focal deficits [Normal Affect] : the affect was normal [Deep Tendon Reflexes (DTR)] : deep tendon reflexes were 2+ and symmetric [Normal Insight/Judgement] : insight and judgment were intact

## 2024-03-19 NOTE — ADDENDUM
[FreeTextEntry1] : I, Marco Bourne, acted as a scribe on behalf of Dr. Mao Simons MD, on 03/19/2024.   All medical entries made by the scribe were at my, Dr. Mao Simons MD, direction and personally dictated by me on 03/19/2024. I have reviewed the chart and agree that the record accurately reflects my personal performance of the history, physical exam, assessment and plan. I have also personally directed, reviewed, and agreed with the chart.

## 2024-03-19 NOTE — HISTORY OF PRESENT ILLNESS
[FreeTextEntry1] : Patient presents for annual physical exam. [de-identified] : Patient is doing well overall. Followed with Podiatry for foot pain. Wishes to start medication for weight loss. Hesitant to start GLP1 agonist, given Hx of thyroid nodules. Denies any CP, chest tightness or SOB. Denies any abdominal pain, urinary symptom or change in bowel habits. Denies any fever, chills or night sweats.

## 2024-03-25 LAB
25(OH)D3 SERPL-MCNC: 21.5 NG/ML
ALBUMIN SERPL ELPH-MCNC: 4.3 G/DL
ALP BLD-CCNC: 61 U/L
ALT SERPL-CCNC: 29 U/L
ANION GAP SERPL CALC-SCNC: 11 MMOL/L
APPEARANCE: CLEAR
AST SERPL-CCNC: 20 U/L
BASOPHILS # BLD AUTO: 0.05 K/UL
BASOPHILS NFR BLD AUTO: 1 %
BILIRUB SERPL-MCNC: 0.3 MG/DL
BILIRUBIN URINE: NEGATIVE
BLOOD URINE: NEGATIVE
BUN SERPL-MCNC: 11 MG/DL
CALCIUM SERPL-MCNC: 9.4 MG/DL
CHLORIDE SERPL-SCNC: 104 MMOL/L
CHOLEST SERPL-MCNC: 211 MG/DL
CO2 SERPL-SCNC: 24 MMOL/L
COLOR: YELLOW
CREAT SERPL-MCNC: 0.81 MG/DL
EGFR: 89 ML/MIN/1.73M2
EOSINOPHIL # BLD AUTO: 0.15 K/UL
EOSINOPHIL NFR BLD AUTO: 2.9 %
ESTIMATED AVERAGE GLUCOSE: 108 MG/DL
GLUCOSE QUALITATIVE U: NEGATIVE MG/DL
GLUCOSE SERPL-MCNC: 104 MG/DL
HBA1C MFR BLD HPLC: 5.4 %
HCT VFR BLD CALC: 40.8 %
HDLC SERPL-MCNC: 67 MG/DL
HGB BLD-MCNC: 12.4 G/DL
IMM GRANULOCYTES NFR BLD AUTO: 0 %
KETONES URINE: NEGATIVE MG/DL
LDLC SERPL CALC-MCNC: 128 MG/DL
LEUKOCYTE ESTERASE URINE: NEGATIVE
LYMPHOCYTES # BLD AUTO: 1.06 K/UL
LYMPHOCYTES NFR BLD AUTO: 20.3 %
MAN DIFF?: NORMAL
MCHC RBC-ENTMCNC: 21.3 PG
MCHC RBC-ENTMCNC: 30.4 GM/DL
MCV RBC AUTO: 70.1 FL
MONOCYTES # BLD AUTO: 0.54 K/UL
MONOCYTES NFR BLD AUTO: 10.4 %
NEUTROPHILS # BLD AUTO: 3.41 K/UL
NEUTROPHILS NFR BLD AUTO: 65.4 %
NITRITE URINE: NEGATIVE
NONHDLC SERPL-MCNC: 143 MG/DL
PH URINE: 6.5
PLATELET # BLD AUTO: 348 K/UL
POTASSIUM SERPL-SCNC: 4.3 MMOL/L
PROT SERPL-MCNC: 6.6 G/DL
PROTEIN URINE: NEGATIVE MG/DL
RBC # BLD: 5.82 M/UL
RBC # FLD: 16 %
SODIUM SERPL-SCNC: 139 MMOL/L
SPECIFIC GRAVITY URINE: 1.01
TRIGL SERPL-MCNC: 90 MG/DL
TSH SERPL-ACNC: 1.63 UIU/ML
UROBILINOGEN URINE: 0.2 MG/DL
VIT B12 SERPL-MCNC: 438 PG/ML
WBC # FLD AUTO: 5.21 K/UL

## 2024-06-13 ENCOUNTER — RX RENEWAL (OUTPATIENT)
Age: 49
End: 2024-06-13

## 2024-06-13 RX ORDER — ERGOCALCIFEROL 1.25 MG/1
1.25 MG CAPSULE, LIQUID FILLED ORAL
Qty: 12 | Refills: 0 | Status: ACTIVE | COMMUNITY
Start: 2023-03-17 | End: 1900-01-01

## 2024-09-03 ENCOUNTER — RX RENEWAL (OUTPATIENT)
Age: 49
End: 2024-09-03

## 2024-12-27 ENCOUNTER — RX RENEWAL (OUTPATIENT)
Age: 49
End: 2024-12-27

## 2025-03-17 ENCOUNTER — RX RENEWAL (OUTPATIENT)
Age: 50
End: 2025-03-17

## 2025-03-24 ENCOUNTER — APPOINTMENT (OUTPATIENT)
Dept: INTERNAL MEDICINE | Facility: CLINIC | Age: 50
End: 2025-03-24
Payer: COMMERCIAL

## 2025-03-24 ENCOUNTER — NON-APPOINTMENT (OUTPATIENT)
Age: 50
End: 2025-03-24

## 2025-03-24 ENCOUNTER — LABORATORY RESULT (OUTPATIENT)
Age: 50
End: 2025-03-24

## 2025-03-24 VITALS
BODY MASS INDEX: 39.99 KG/M2 | DIASTOLIC BLOOD PRESSURE: 78 MMHG | HEIGHT: 65 IN | HEART RATE: 70 BPM | TEMPERATURE: 98 F | OXYGEN SATURATION: 99 % | SYSTOLIC BLOOD PRESSURE: 111 MMHG | WEIGHT: 240 LBS

## 2025-03-24 DIAGNOSIS — N83.209 UNSPECIFIED OVARIAN CYST, UNSPECIFIED SIDE: ICD-10-CM

## 2025-03-24 DIAGNOSIS — G43.909 MIGRAINE, UNSPECIFIED, NOT INTRACTABLE, W/OUT STATUS MIGRAINOSUS: ICD-10-CM

## 2025-03-24 DIAGNOSIS — Z00.00 ENCOUNTER FOR GENERAL ADULT MEDICAL EXAMINATION W/OUT ABNORMAL FINDINGS: ICD-10-CM

## 2025-03-24 PROCEDURE — G0444 DEPRESSION SCREEN ANNUAL: CPT | Mod: 59

## 2025-03-24 PROCEDURE — 99396 PREV VISIT EST AGE 40-64: CPT | Mod: 25

## 2025-04-02 LAB
25(OH)D3 SERPL-MCNC: 22.6 NG/ML
ALBUMIN SERPL ELPH-MCNC: 4.4 G/DL
ALP BLD-CCNC: 80 U/L
ALT SERPL-CCNC: 28 U/L
ANION GAP SERPL CALC-SCNC: 12 MMOL/L
APPEARANCE: CLEAR
AST SERPL-CCNC: 19 U/L
BASOPHILS # BLD AUTO: 0.04 K/UL
BASOPHILS NFR BLD AUTO: 0.8 %
BILIRUB SERPL-MCNC: 0.6 MG/DL
BILIRUBIN URINE: NEGATIVE
BLOOD URINE: NEGATIVE
BUN SERPL-MCNC: 14 MG/DL
CALCIUM SERPL-MCNC: 9.4 MG/DL
CHLORIDE SERPL-SCNC: 103 MMOL/L
CHOLEST SERPL-MCNC: 224 MG/DL
CO2 SERPL-SCNC: 23 MMOL/L
COLOR: YELLOW
CREAT SERPL-MCNC: 0.86 MG/DL
EGFRCR SERPLBLD CKD-EPI 2021: 82 ML/MIN/1.73M2
EOSINOPHIL # BLD AUTO: 0.16 K/UL
EOSINOPHIL NFR BLD AUTO: 3.4 %
ESTIMATED AVERAGE GLUCOSE: 111 MG/DL
GLUCOSE QUALITATIVE U: NEGATIVE MG/DL
GLUCOSE SERPL-MCNC: 99 MG/DL
HBA1C MFR BLD HPLC: 5.5 %
HCT VFR BLD CALC: 40.6 %
HDLC SERPL-MCNC: 62 MG/DL
HGB BLD-MCNC: 12.1 G/DL
IMM GRANULOCYTES NFR BLD AUTO: 0.2 %
KETONES URINE: NEGATIVE MG/DL
LDLC SERPL-MCNC: 146 MG/DL
LEUKOCYTE ESTERASE URINE: ABNORMAL
LYMPHOCYTES # BLD AUTO: 1.07 K/UL
LYMPHOCYTES NFR BLD AUTO: 22.7 %
MAN DIFF?: NORMAL
MCHC RBC-ENTMCNC: 20.9 PG
MCHC RBC-ENTMCNC: 29.8 G/DL
MCV RBC AUTO: 70.2 FL
MONOCYTES # BLD AUTO: 0.5 K/UL
MONOCYTES NFR BLD AUTO: 10.6 %
NEUTROPHILS # BLD AUTO: 2.93 K/UL
NEUTROPHILS NFR BLD AUTO: 62.3 %
NITRITE URINE: NEGATIVE
NONHDLC SERPL-MCNC: 162 MG/DL
PH URINE: 6.5
PLATELET # BLD AUTO: 372 K/UL
POTASSIUM SERPL-SCNC: 4.5 MMOL/L
PROT SERPL-MCNC: 6.8 G/DL
PROTEIN URINE: NEGATIVE MG/DL
RBC # BLD: 5.78 M/UL
RBC # FLD: 15.1 %
SODIUM SERPL-SCNC: 138 MMOL/L
SPECIFIC GRAVITY URINE: 1.02
TRIGL SERPL-MCNC: 91 MG/DL
TSH SERPL-ACNC: 1.57 UIU/ML
UROBILINOGEN URINE: 0.2 MG/DL
VIT B12 SERPL-MCNC: 485 PG/ML
WBC # FLD AUTO: 4.71 K/UL

## 2025-06-13 ENCOUNTER — RX RENEWAL (OUTPATIENT)
Age: 50
End: 2025-06-13

## 2025-08-26 RX ORDER — SCOPOLAMINE 1 MG/3D
1 PATCH, EXTENDED RELEASE TRANSDERMAL
Qty: 1 | Refills: 0 | Status: ACTIVE | COMMUNITY
Start: 2025-08-26 | End: 1900-01-01

## 2025-09-05 ENCOUNTER — RX RENEWAL (OUTPATIENT)
Age: 50
End: 2025-09-05